# Patient Record
Sex: MALE | Race: OTHER
[De-identification: names, ages, dates, MRNs, and addresses within clinical notes are randomized per-mention and may not be internally consistent; named-entity substitution may affect disease eponyms.]

---

## 2018-03-13 ENCOUNTER — HOSPITAL ENCOUNTER (EMERGENCY)
Dept: HOSPITAL 65 - ER | Age: 33
Discharge: HOME | End: 2018-03-13
Payer: COMMERCIAL

## 2018-03-13 VITALS — SYSTOLIC BLOOD PRESSURE: 128 MMHG | DIASTOLIC BLOOD PRESSURE: 74 MMHG

## 2018-03-13 VITALS — WEIGHT: 290 LBS | HEIGHT: 66 IN | BODY MASS INDEX: 46.61 KG/M2

## 2018-03-13 VITALS — DIASTOLIC BLOOD PRESSURE: 83 MMHG | SYSTOLIC BLOOD PRESSURE: 129 MMHG

## 2018-03-13 VITALS — DIASTOLIC BLOOD PRESSURE: 74 MMHG | SYSTOLIC BLOOD PRESSURE: 128 MMHG

## 2018-03-13 DIAGNOSIS — M54.6: ICD-10-CM

## 2018-03-13 DIAGNOSIS — K52.9: Primary | ICD-10-CM

## 2018-03-13 LAB
ALP INTEST CFR SERPL: 90 U/L (ref 50–136)
ALT SERPL-CCNC: 78 U/L (ref 12–78)
AST SERPL-CCNC: 25 U/L (ref 0–35)
BASOPHILS # BLD AUTO: 0 10^3/UL (ref 0–0.1)
BASOPHILS NFR BLD AUTO: 0.1 % (ref 0–0.2)
CALCIUM SERPL-MCNC: 8.9 MG/DL (ref 8.4–10.5)
CO2 BLDA-SCNC: 26.2 MMOL/L (ref 20–32)
EOSINOPHIL # BLD AUTO: 0 10^3/UL (ref 0–0.2)
EOSINOPHIL NFR BLD AUTO: 0.4 % (ref 0–5)
ERYTHROCYTE [DISTWIDTH] IN BLOOD BY AUTOMATED COUNT: 12.9 % (ref 11.5–14.5)
GLUCOSE PRE 100 G GLC PO SERPL-MCNC: 135 MG/DL (ref 70–110)
HGB BLD-MCNC: 15.5 G/DL (ref 13.9–16.3)
LYMPHOCYTES # BLD AUTO: 0.5 10^3/UL (ref 1–4.8)
LYMPHOCYTES NFR BLD AUTO: 7.1 % (ref 24–44)
MCH RBC QN AUTO: 29.4 PG (ref 26–34)
MCHC RBC AUTO-ENTMCNC: 33.8 G/DL (ref 33–37)
MCV RBC AUTO: 86.9 FL (ref 78–100)
MONOCYTES # BLD AUTO: 0.4 10^3/UL (ref 0.3–0.8)
MONOCYTES NFR BLD AUTO: 5.4 % (ref 5–12)
NEUTROPHILS # BLD AUTO: 6.4 10^3/UL (ref 1.8–7.7)
NEUTROPHILS NFR BLD AUTO: 86.7 % (ref 41–85)
PLATELET # BLD AUTO: 121 10^3/UL (ref 150–400)
PMV BLD AUTO: 10.7 FL (ref 7.8–11)
WBC # BLD AUTO: 7.3 10^3/UL (ref 4.5–11)

## 2018-03-13 PROCEDURE — 99284 EMERGENCY DEPT VISIT MOD MDM: CPT

## 2018-03-13 PROCEDURE — 96374 THER/PROPH/DIAG INJ IV PUSH: CPT

## 2018-03-13 PROCEDURE — 80053 COMPREHEN METABOLIC PANEL: CPT

## 2018-03-13 PROCEDURE — 85025 COMPLETE CBC W/AUTO DIFF WBC: CPT

## 2018-03-13 PROCEDURE — 96361 HYDRATE IV INFUSION ADD-ON: CPT

## 2018-03-13 PROCEDURE — 83690 ASSAY OF LIPASE: CPT

## 2018-03-13 PROCEDURE — 36415 COLL VENOUS BLD VENIPUNCTURE: CPT

## 2018-03-13 NOTE — ER.PDOC
General


Chief Complaint:  Nausea,Vomiting,Diarrhea


Stated Complaint:  NAUSEA,DEHYDRATION,DIARRHEA


Time seen by MD:  04:12


Source:  patient


Exam Limitations:  no limitations





History of Present Illness


Initial Comments


Nausea and diarrhea since last night


Severity/Quality:  moderate


Associated Symptoms (vomiting):  mild vomiting


Associated Symptoms (diarrhea):  watery


Allergies:  


Coded Allergies:  


     iodine (Verified  Allergy, Intermediate, Rash, 3/13/18)


Vital Signs





First Vital Signs








  Date Time  Temp Pulse Resp B/P (MAP) Pulse Ox O2 Delivery O2 Flow Rate FiO2


 


3/13/18 03:53 98.4 128 20  97   


 


3/13/18 04:06    129/83 (98)    








Last Vital Signs








  Date Time  Temp Pulse Resp B/P (MAP) Pulse Ox O2 Delivery O2 Flow Rate FiO2


 


3/13/18 04:06 98.4 128 20 129/83 (98) 97   











Past Medical History


Medical History:  other


Surgical History:  no surgical history





Social History


Smoking:  non-smoker


Alcohol Use:  rarely


Drug Use:  none





Constitutional:  no symptoms reported


Respiratory:  no symptoms reported


Cardiovascular:  no symptoms reported


Gastrointestinal:  see HPI


Genitourinary:  no symptoms reported


Musculoskeletal:  back pain (upper)


All Other Systems:  Reviewed and Negative





Physical Exam


General Appearance:  Obese


HEENT:  PERRL/EOMI, Normal ENT Inspection, TMs Normal, Pharynx Normal


Neck:  Non-Tender, Full Range of Motion, Supple, Normal Inspection


Respiratory:  chest non-tender, lungs clear, normal breath sounds, no 

respiratory distress, no accessory muscle use


Cardiovascular:  Normal Peripheral Pulses, Regular Rate, Rhythm, No Edema, No 

Gallop, No JVD, No Murmur


Gastrointestinal:  Normal Bowel Sounds, No Organomegaly, No Pulsatile Mass, 

Tenderness (epigastric)


Back:  Normal Inspection, No CVA Tenderness, No Vertebral Tenderness, Other (

tenderness paraspinous muslcles of upper back)


Extremities:  Normal Range of Motion, Non-Tender, Normal Inspection, No Pedal 

Edema, No Calf Tenderness, Normal Capillary Refill, Pelvis Stable


Neurologic/Psychiatric:  CNs II-XII NML as Tested, No Motor/Sensory Deficits, 

Alert, Normal Mood/Affect, Oriented x 3


Skin:  Normal Color, Warm/Dry


Lymphatic:  No Adenopathy





Results/Orders


Results/Orders





Laboratory Tests








Test


  3/13/18


04:25


 


White Blood Count


  7.3 10^3/uL


(4.5-11.0)


 


Red Blood Count


  5.28 10^6/uL


(4.50-5.90)


 


Hemoglobin


  15.5 g/dL


(13.9-16.3)


 


Hematocrit


  45.9 %


(37.0-53.0)


 


Mean Corpuscular Volume


  86.9 fL


()


 


Mean Corpuscular Hemoglobin


  29.4 pg


(26-34)


 


Mean Corpuscular Hemoglobin


Concent 33.8 g/dL


(33-37)


 


Red Cell Distribution Width


  12.9 %


(11.5-14.5)


 


Platelet Count


  121 10^3/uL


(150-400)


 


Mean Platelet Volume


  10.7 fL


(7.8-11.0)


 


Neutrophils (%) (Auto)


  86.7 %


(41.0-85.0)


 


Lymphocytes (%) (Auto)


  7.1 %


(24.0-44.0)


 


Monocytes (%) (Auto)


  5.4 %


(5.0-12.0)


 


Neutrophils # (Auto)


  6.4 10^3/uL


(1.8-7.7)


 


Lymphocytes # (Auto)


  0.5 10^3/uL


(1.0-4.8)


 


Monocytes # (Auto)


  0.4 10^3/uL


(0.3-0.8)


 


Absolute Immature Granulocyte


(auto 0.02 10^3 u/L


(0-2)


 


Eosinophils %


  0.4 %


(0.0-5.0)


 


Basophils %


  0.1 %


(0.0-0.2)


 


Basophils #


  0.0 10^3/uL


(0.0-0.1)


 


Eosinophil Count


  0.0 10^3/uL


(0.0-0.2)


 


Sodium Level


  140 mmol/L


(132-145)


 


Potassium Level


  4.0 mmol/L


(3.6-5.2)


 


Chloride Level


  104.0 mmol/L


()


 


Carbon Dioxide Level


  26.2 mmol/L


(20.0-32)


 


Anion Gap 13.8 


 


Blood Urea Nitrogen


  14 mg/dL


(7-18)


 


Creatinine


  1.21 mg/dL


(0.59-1.40)


 


Estimated GFR (


American) 83.6 (>/=60) 


 


 


BUN/Creatinine Ratio 11.0 


 


Glucose Level


  135 mg/dL


()


 


Calcium Level


  8.9 mg/dL


(8.4-10.5)


 


Total Bilirubin


  1.0 mg/dL


(0.2-1.0)


 


Aspartate Amino Transf


(AST/SGOT) 25 U/L (0-35) 


 


 


Alanine Aminotransferase


(ALT/SGPT) 78 U/L (12-78) 


 


 


Alkaline Phosphatase


  90 U/L


()


 


Total Protein


  8.0 g/dL


(6.4-8.2)


 


Albumin


  3.7 g/dL


(3.4-5.0)


 


Globulin 4.3 


 


Lipase


  86 U/L


(114-286)


 


Percent Immature Gran (Cell


Imm) 0.30 %


(0.00-0.50)








Administered Medications








 Medications


  (Trade)  Dose


 Ordered  Sig/Chloe


 Route


 PRN Reason  Start Time


 Stop Time Status Last Admin


Dose Admin


 


 Sodium Chloride  1,000 ml @ 


 1,200 mls/hr  Q50M STAT


 IV


   3/13/18 04:10


 3/13/18 04:59 DC 3/13/18 04:33


 


 


 Loperamide HCl


  (Imodium)  4 mg  STAT  STAT


 PO


   3/13/18 04:10


 3/13/18 04:13 DC 3/13/18 04:20


 


 


 Ondansetron HCl


  (Zofran)  4 mg  STAT  STAT


 IV


   3/13/18 04:31


 3/13/18 04:32 DC 3/13/18 04:33


 











Progress


Progress


Feeling better.





Departure


Time of Disposition:  05:18


Disposition:  01 HOME, SELF-CARE


Impression:  


 Primary Impression:  


 Gastroenteritis


 Additional Impression:  


 Back pain


 Qualified Codes:  M54.9 - Dorsalgia, unspecified


Condition:  Improved


Referrals:  


MARYAN MALIK (PCP)


PRIMARY CARE PROVIDER





Additional Instructions:  


Imodium OTC as directed


Ibuprofen 800mg PO TID as needed for pain


F/U with your PCP in 2-3 days


Duration or Time Spent with Pa:  60 mins











ANASTACIO HDZ MD Mar 13, 2018 04:13

## 2018-06-10 ENCOUNTER — HOSPITAL ENCOUNTER (EMERGENCY)
Dept: HOSPITAL 65 - ER | Age: 33
Discharge: HOME | End: 2018-06-10
Payer: COMMERCIAL

## 2018-06-10 VITALS — DIASTOLIC BLOOD PRESSURE: 84 MMHG | SYSTOLIC BLOOD PRESSURE: 171 MMHG

## 2018-06-10 VITALS — WEIGHT: 296 LBS | HEIGHT: 66 IN | BODY MASS INDEX: 47.57 KG/M2

## 2018-06-10 DIAGNOSIS — Z88.8: ICD-10-CM

## 2018-06-10 DIAGNOSIS — T78.40XA: Primary | ICD-10-CM

## 2018-06-10 PROCEDURE — 99284 EMERGENCY DEPT VISIT MOD MDM: CPT

## 2018-06-10 PROCEDURE — 96372 THER/PROPH/DIAG INJ SC/IM: CPT

## 2018-06-10 PROCEDURE — 96374 THER/PROPH/DIAG INJ IV PUSH: CPT

## 2018-06-10 NOTE — ER.PDOC
General


Chief Complaint:  Allergic Reaction


Stated Complaint:  POSS ALLG REACTION


Time seen by MD:  01:34


Source:  patient


Exam Limitations:  no limitations





History of Present Illness


Initial Comments


Allergic reaction to Azithromycin. Took last dose yesterday and was treated at 

[facility] for same. He was given a prescription which he has not filled.


Severity:  moderate


Associated Symptoms:  skin rash, throat swollen


Identified Cause:  possibly


Exposure:  antibiotic


Prior symptoms/Treatment:  Similar symptoms previous, Recenly Seen, Treated by 

Doctor


Allergies:  


Coded Allergies:  


     iodine (Verified  Allergy, Intermediate, Rash, 3/13/18)


Vital Signs





First Vital Signs








  Date Time  Temp Pulse Resp B/P (MAP) Pulse Ox O2 Delivery O2 Flow Rate FiO2


 


6/10/18 01:17 98.1 79 18  97 Room Air  


 


6/10/18 01:24    171/84 (113)    








Last Vital Signs








  Date Time  Temp Pulse Resp B/P (MAP) Pulse Ox O2 Delivery O2 Flow Rate FiO2


 


6/10/18 01:24 98.1 79 18 171/84 (113) 97 Room Air  











Past Medical History


Medical History:  other


Surgical History:  no surgical history





Social History


Smoking:  non-smoker


Alcohol Use:  rarely


Drug Use:  none





Constitutional:  no symptoms reported


EENTM:  see HPI


Respiratory:  no symptoms reported


Cardiovascular:  no symptoms reported


Gastrointestinal:  no symptoms reported


Genitourinary:  no symptoms reported


All Other Systems:  Reviewed and Negative





Physical Exam


General Appearance:  alert, no distress


HEENT:  ENT nml inspection, pharynx, voice nml


Skin:  nml color, warm/dry, urticaria (mild 0n trunk)


Extremities:  non-tender, nml ROM, no edema


Neck:  nml inspection


Respiratory:  no resp. distress, breath sounds nml


CVS:  reg. rate & rhythm, heart sounds nml


Abdomen:  non-tender, no organomegaly


NEURO/PSYCH:  oriented x 3, CN's nml as tested, motor nml, sensation nml, mood/

affect nml





Departure


Time of Disposition:  01:38


Disposition:  01 HOME, SELF-CARE


Impression:  


 Primary Impression:  


 Acute allergic reaction


Condition:  Improved


Referrals:  


MARYAN MALIK (PCP)


PRIMARY CARE PROVIDER





Additional Instructions:  


Prednisone


Benadryl


Pepcid


F/U with PCP in 2-3 days


Duration or Time Spent with Pa:  50 mins





Problem Qualifiers








 Primary Impression:  


 Acute allergic reaction


 Encounter type:  initial encounter  Qualified Codes:  T78.40XA - Allergy, 

unspecified, initial encounter








ANDREINA,ANASTACIO RIOS MD Cl 10, 2018 01:40